# Patient Record
(demographics unavailable — no encounter records)

---

## 2025-05-29 NOTE — DISCUSSION/SUMMARY
[FreeTextEntry1] : 41 yo no significant PMHx presents for evaluation of new onset seizures on 5/24/2025  plan: Switched Keppra to ER 1g/nightly to reduce fatigue MRI brain w/wout contrast 48HR AEEG Counseled on seizure safety precautions and [place] driving regulations F/u in 1 month

## 2025-05-29 NOTE — PHYSICAL EXAM
[General Appearance - Alert] : alert [General Appearance - In No Acute Distress] : in no acute distress [Oriented To Time, Place, And Person] : oriented to person, place, and time [Person] : oriented to person [Place] : oriented to place [Time] : oriented to time [Fluency] : fluency intact [Cranial Nerves Optic (II)] : visual acuity intact bilaterally,  visual fields full to confrontation, pupils equal round and reactive to light [Cranial Nerves Oculomotor (III)] : extraocular motion intact [Cranial Nerves Trigeminal (V)] : facial sensation intact symmetrically [Cranial Nerves Facial (VII)] : face symmetrical [Cranial Nerves Vestibulocochlear (VIII)] : hearing was intact bilaterally [Cranial Nerves Accessory (XI - Cranial And Spinal)] : head turning and shoulder shrug symmetric [Motor Tone] : muscle tone was normal in all four extremities [Motor Strength] : muscle strength was normal in all four extremities [Sensation Tactile Decrease] : light touch was intact [Abnormal Walk] : normal gait [Balance] : balance was intact [Romberg's Sign] : Romberg's sign was negtive [Coordination - Dysmetria Impaired Finger-to-Nose Bilateral] : not present

## 2025-05-29 NOTE — HISTORY OF PRESENT ILLNESS
[FreeTextEntry1] : 43 yo no significant PMHx presents for evaluation of new onset seizures Accompanied by wife  On 5/24/2025 patient was working out in the evening. He felt lightheaded which was followed by loss consciousness. The wife reports she heard grunting and walked in to see him on the floor shaking/stiffening, +tongue bite and then postictal confusion. EMS called, taken to [facility] in [place]. Had another seizure in ER, treated with Ativan 2 mg and Keppra 1000mg. Admitted overnight for observation. Next thing patient remembers is waking up in hospital.  Routine EEG was normal. CTH motion degraded but not acute pathology Discharged on Keppra 500mg BID Patient reports since starting Keppra he has been feeling fatigue.  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

## 2025-06-11 NOTE — HISTORY OF PRESENT ILLNESS
[de-identified] : 41 y/o right- handed male, never smoker, with no significant PMHx who presents for evaluation of trace subacute subdural hemorrhagic collection, referred by neurologist Dr. Bhumika Jalloh.   - On 5/24/2025 patient was working out in the evening (strenuous lifting/squat). He recalls feeling lightheaded and then LOC. His spouse heard a large sound and found him on the floor shaking/stiffening, +tongue bite and then postictal confusion. EMS was called and he was taken to [facility] in [place]. Had another seizure in ER there, treated with Ativan 2 mg and Keppra 1000mg. Routine EEG normal. CTH was motion degraded but without acute pathology. He was discharged on 500 mg keppra BID.   - 5/28/25 pt had evaluation with neurologist Dr. Jalloh who ordered 48 hr EEG and ordered MRI Brain w/wo which he completed on 6/7/25 which reported a Thin crescentic T1 and FLAIR hyperintense signal along the left posterior cerebral convexity, suggestive of a trace subacute subdural hemorrhagic collection. No associated mass effect. No structural epileptogenic lesion is identified.  Due to findings, patient was referred to neurosurgery.   - Presents today for evaluation.  He notes continued intermittent dull headaches to the front and top of his head, left worse than right, worse when bending over.  Continues on Keppra per Dr. Jalloh  Neurology: Bhumika Jalloh

## 2025-06-11 NOTE — DATA REVIEWED
[de-identified] : ACC: [id number]     EXAM:  MR BRAIN SEIZURE EP WAW IC 3D#   ORDERED BY: SCOTT FOWLER  PROCEDURE DATE:  06/07/2025    INTERPRETATION:  PROCEDURE: MRI Brain without and with contrast  INDICATION: New onset seizure  TECHNIQUE: MRI Brain was performed without and with contrast, utilizing a seizure protocol. Sagittal T1, axial T1, T2, FLAIR, diffusion, gradient echo images of the brain were obtained. Coronal FLAIR and T2 images through the temporal lobes were also obtained. 3-D volumetric T1-weighted imaging obtained.  IV Contrast: Gadavist  7ml cc administered .5ml cc discarded  COMPARISON: none available  FINDINGS:  The ventricles and sulci are normal in size and configuration.  There is no mass, midline shift or regional mass effect.  There is no acute parenchymal hemorrhage. There is thin crescentic extra-axial FLAIR hyperintense signal about the left parieto-occipital and temporal convexities, with associated T1 hyperintense signal, suggestive of a thin subacute hemorrhagic subdural collection.  No evidence of recent infarction. No abnormal intracranial enhancement.   Evaluation of the temporal lobes demonstrates the hippocampi to be within normal limits, without definite signal abnormality or atrophy.  No developmental cortical malformation is identified. There is no significant parenchymal abnormal susceptibility.  The dominant arterial skull base flow voids are present.  Partially empty sella noted.  The visualized paranasal sinuses demonstrate trace diffuse mucosal thickening. The mastoid air cells are well-aerated.   IMPRESSION:  No structural epileptogenic lesion is identified.  Thin crescentic T1 and FLAIR hyperintense signal along the left posterior cerebral convexity, suggestive of a trace subacute subdural hemorrhagic collection. No associated mass effect.  Findings were discussed with Dr. Fowler by phone on 6/9/2025 3:03 PM with read back confirmation.  --- End of Report ---

## 2025-06-11 NOTE — ASSESSMENT
[FreeTextEntry1] : This 42-year-old male was referred by Dr. Jalloh for evaluation of a trace subacute subdural hemorrhage.  He had a syncopal episode and seizure while exercising at home approximately 2.5 to 3 weeks ago. He was taken to [facility], where he had another seizure in the ER. The initial head CT was reportedly without acute pathology. He was discharged on Keppra 500 mg. A subsequent 48-hour EEG was negative. Brain MRI showed a crescent-shaped FLAIR hyperintensity in the left posterior region, suggestive of subacute subdural hemorrhage. The plan is to repeat the MRI in two weeks, defer to neurology/epileptology for further workup of seizures, continue Keppra, advise the patient to avoid strenuous activity, and follow up after repeat imaging.   Dr. D'Amico independently reviewed all available images with patient and his spouse.    PLAN: - Alternate advil/ tylenol for headache management - Repeat MRI in 2 weeks with follow- up after for review  - Continue follow- up with neurologist Dr. Jalloh for seizure workup   Patient verbalizes understanding of today's discussion and next steps in treatment plan.   Today, my ACP, Sonia Callahan, was here to observe my evaluation and management services for this patient to be followed going forward.     Patient appreciates and agrees with current plan. This note was generated using medical dictation software. A reasonable effort has been made for proofreading its contents, but typos may still remain. If there are any questions or points of clarification needed, please notify my office.  A total of 45 minutes was spent reviewing the labs, imaging and physical examination of the patient. We discussed the diagnosis, and the plan. The patient's questions were answered. The patient demonstrated an excellent understanding of the plan.

## 2025-06-11 NOTE — HISTORY OF PRESENT ILLNESS
[de-identified] : 43 y/o right- handed male, never smoker, with no significant PMHx who presents for evaluation of trace subacute subdural hemorrhagic collection, referred by neurologist Dr. Bhumika Jalloh.   - On 5/24/2025 patient was working out in the evening (strenuous lifting/squat). He recalls feeling lightheaded and then LOC. His spouse heard a large sound and found him on the floor shaking/stiffening, +tongue bite and then postictal confusion. EMS was called and he was taken to [facility] in [place]. Had another seizure in ER there, treated with Ativan 2 mg and Keppra 1000mg. Routine EEG normal. CTH was motion degraded but without acute pathology. He was discharged on 500 mg keppra BID.   - 5/28/25 pt had evaluation with neurologist Dr. Jalloh who ordered 48 hr EEG and ordered MRI Brain w/wo which he completed on 6/7/25 which reported a Thin crescentic T1 and FLAIR hyperintense signal along the left posterior cerebral convexity, suggestive of a trace subacute subdural hemorrhagic collection. No associated mass effect. No structural epileptogenic lesion is identified.  Due to findings, patient was referred to neurosurgery.   - Presents today for evaluation.  He notes continued intermittent dull headaches to the front and top of his head, left worse than right, worse when bending over.  Continues on Keppra per Dr. Jalloh  Neurology: Bhumika Jalloh

## 2025-06-11 NOTE — ASSESSMENT
[FreeTextEntry1] : This 42-year-old male was referred by Dr. Jalloh for evaluation of a trace subacute subdural hemorrhage.  He had a syncopal episode and seizure while exercising at home approximately 2.5 to 3 weeks ago. He was taken to [facility], where he had another seizure in the ER. The initial head CT was reportedly without acute pathology. He was discharged on Keppra 500 mg. A subsequent 48-hour EEG was negative. Brain MRI showed a crescent-shaped FLAIR hyperintensity in the left posterior region, suggestive of subacute subdural hemorrhage. The plan is to repeat the MRI in two weeks, defer to neurology/epileptology for further workup of seizures, continue Keppra, advise the patient to avoid strenuous activity, and follow up after repeat imaging.   Dr. D'Amico independently reviewed all available images with patient and his spouse.    PLAN: - Alternate advil/ tylenol for headache management - Repeat MRI in 2 weeks with follow- up after for review  - Continue follow- up with neurologist Dr. Jalloh for seizure workup   Patient verbalizes understanding of today's discussion and next steps in treatment plan.   Today, my ACP, Sonia Callahan, was here to observe my evaluation and management services for this patient to be followed going forward.     Patient appreciates and agrees with current plan. This note was generated using medical dictation software. A reasonable effort has been made for proofreading its contents, but typos may still remain. If there are any questions or points of clarification needed, please notify my office.  A total of 45 minutes was spent reviewing the labs, imaging and physical examination of the patient. We discussed the diagnosis, and the plan. The patient's questions were answered. The patient demonstrated an excellent understanding of the plan.

## 2025-06-11 NOTE — DATA REVIEWED
[de-identified] : ACC: [id number]     EXAM:  MR BRAIN SEIZURE EP WAW IC 3D#   ORDERED BY: SCOTT FOWLER  PROCEDURE DATE:  06/07/2025    INTERPRETATION:  PROCEDURE: MRI Brain without and with contrast  INDICATION: New onset seizure  TECHNIQUE: MRI Brain was performed without and with contrast, utilizing a seizure protocol. Sagittal T1, axial T1, T2, FLAIR, diffusion, gradient echo images of the brain were obtained. Coronal FLAIR and T2 images through the temporal lobes were also obtained. 3-D volumetric T1-weighted imaging obtained.  IV Contrast: Gadavist  7ml cc administered .5ml cc discarded  COMPARISON: none available  FINDINGS:  The ventricles and sulci are normal in size and configuration.  There is no mass, midline shift or regional mass effect.  There is no acute parenchymal hemorrhage. There is thin crescentic extra-axial FLAIR hyperintense signal about the left parieto-occipital and temporal convexities, with associated T1 hyperintense signal, suggestive of a thin subacute hemorrhagic subdural collection.  No evidence of recent infarction. No abnormal intracranial enhancement.   Evaluation of the temporal lobes demonstrates the hippocampi to be within normal limits, without definite signal abnormality or atrophy.  No developmental cortical malformation is identified. There is no significant parenchymal abnormal susceptibility.  The dominant arterial skull base flow voids are present.  Partially empty sella noted.  The visualized paranasal sinuses demonstrate trace diffuse mucosal thickening. The mastoid air cells are well-aerated.   IMPRESSION:  No structural epileptogenic lesion is identified.  Thin crescentic T1 and FLAIR hyperintense signal along the left posterior cerebral convexity, suggestive of a trace subacute subdural hemorrhagic collection. No associated mass effect.  Findings were discussed with Dr. Fowler by phone on 6/9/2025 3:03 PM with read back confirmation.  --- End of Report ---

## 2025-06-11 NOTE — REVIEW OF SYSTEMS
[As Noted in HPI] : as noted in HPI [Seizures] : convulsions [Fever] : no fever [Chills] : no chills [Confused or Disoriented] : no confusion [Memory Lapses or Loss] : no memory loss [Facial Weakness] : no facial weakness [Arm Weakness] : no arm weakness [Hand Weakness] : no hand weakness [Leg Weakness] : no leg weakness [Chest Pain] : no chest pain [Palpitations] : no palpitations [Shortness Of Breath] : no shortness of breath

## 2025-06-11 NOTE — PHYSICAL EXAM
[Oriented To Time, Place, And Person] : oriented to person, place, and time [Impaired Insight] : insight and judgment were intact [Memory Recent] : recent memory was not impaired [Sclera] : the sclera and conjunctiva were normal [Neck Appearance] : the appearance of the neck was normal [] : no respiratory distress [Respiration, Rhythm And Depth] : normal respiratory rhythm and effort [Abnormal Walk] : normal gait [Skin Color & Pigmentation] : normal skin color and pigmentation

## 2025-07-03 NOTE — REASON FOR VISIT
[Follow-Up: _____] : a [unfilled] follow-up visit [Home] : at home, [unfilled] , at the time of the visit. [Medical Office: ([facility])___] : at the medical office located in  [Telehealth (audio & video)] : This visit was provided via telehealth using real-time 2-way audio visual technology. [Verbal consent obtained from patient] : the patient, [unfilled] [FreeTextEntry1] : hx of trace subacute SDH; follow up and MRI review

## 2025-07-03 NOTE — ASSESSMENT
[FreeTextEntry1] : This 42-year-old male presented for follow-up of a subacute subdural hematoma, possibly related to weightlifting or a fall.  It is unclear whether a fall led to a seizure or vice versa.  He is following up with neurology (Dr. Jalloh).  A repeat MRI was ordered approximately three weeks after the initial incident.  A 3-week follow-up MRI showed resolution of the subdural hematoma.  He reported significant improvement in his symptoms, including decreased headache frequency and severity, and improved cognitive function. The plan is to review the repeat MRI results, assess his symptoms and recovery, discuss the findings, consider clearance for normal activity, coordinate care with neurology, and schedule follow-up as needed. He was cleared to fly and resume contact sports.  Dr. D'Amico independently reviewed all available images with patient.   PLAN: - Continue f/u with neurologist Dr. Jalloh, AED and weaning per her - Cleared to resume normal activities slowly and progress as tolerated  - Cleared to fly - RTC prn   Patient verbalizes understanding of today's discussion and next steps in treatment plan.   Today, my ACP, Sonia Callahan, was here to observe my evaluation and management services for this new problem/exacerbated condition to be followed going forward.          Patient appreciates and agrees with current plan. This note was generated using medical dictation software. A reasonable effort has been made for proofreading its contents, but typos may still remain. If there are any questions or points of clarification needed, please notify my office.   A total of 25 minutes was spent reviewing the labs, imaging and physical examination of the patient. We discussed the diagnosis, and the plan. The patient's questions were answered. The patient demonstrated an excellent understanding of the plan.

## 2025-07-03 NOTE — REASON FOR VISIT
[Follow-Up: _____] : a [unfilled] follow-up visit [Home] : at home, [unfilled] , at the time of the visit. [Medical Office: ([facility])___] : at the medical office located in  [Telehealth (audio & video)] : This visit was provided via telehealth using real-time 2-way audio visual technology. [Verbal consent obtained from patient] : the patient, [unfilled] [FreeTextEntry1] : hx of trace subacute SDH; follow up and MRI review

## 2025-07-03 NOTE — HISTORY OF PRESENT ILLNESS
[FreeTextEntry1] : 43 y/o right- handed male, never smoker, with no significant PMHx who presented for evaluation of trace subacute subdural hemorrhagic collection, referred by neurologist Dr. Bhumika Jalloh.  - On 5/24/2025 patient was working out in the evening (strenuous lifting/squat). He recalls feeling lightheaded and then LOC. His spouse heard a large sound and found him on the floor shaking/stiffening, +tongue bite and then postictal confusion. EMS was called and he was taken to [facility] in [place]. Had another seizure in ER there, treated with Ativan 2 mg and Keppra 1000mg. Routine EEG normal. CTH was motion degraded but without acute pathology. He was discharged on 500 mg keppra BID.  - 5/28/25 pt had evaluation with neurologist Dr. Jalloh who ordered 48 hr EEG and ordered MRI Brain w/wo which he completed on 6/7/25 which reported a Thin crescentic T1 and FLAIR hyperintense signal along the left posterior cerebral convexity, suggestive of a trace subacute subdural hemorrhagic collection. No associated mass effect. No structural epileptogenic lesion is identified. Due to findings, patient was referred to neurosurgery.  - 6/11/25 pt presented for initial evaluation. Noted continued intermittent dull headaches to the front and top of his head, left worse than right, worse when bending over. Continues on Keppra per Dr. Jalloh.  Recommended to repeat imaging with MRI in 2 weeks; continued neurology/epileptology workup.   Returns TODAY for follow- up and MRI review.  6/28/25 MRI- Previous SDH no longer seen He endorses overall with improvement over the past week; headache improved and no longer constant, still has residual 10 second bouts of headaches intermittently.   Neurology: Bhumika Jalloh

## 2025-07-03 NOTE — REASON FOR VISIT
[Follow-Up: _____] : a [unfilled] follow-up visit [Home] : at home, [unfilled] , at the time of the visit. [Medical Office: ([facility])___] : at the medical office located in  [Telehealth (audio & video)] : This visit was provided via telehealth using real-time 2-way audio visual technology. [Verbal consent obtained from patient] : the patient, [unfilled] [FreeTextEntry1] : hx of trace subacute SDH; follow up and MRI review

## 2025-07-03 NOTE — HISTORY OF PRESENT ILLNESS
[FreeTextEntry1] : 41 y/o right- handed male, never smoker, with no significant PMHx who presented for evaluation of trace subacute subdural hemorrhagic collection, referred by neurologist Dr. Bhumika Jalloh.  - On 5/24/2025 patient was working out in the evening (strenuous lifting/squat). He recalls feeling lightheaded and then LOC. His spouse heard a large sound and found him on the floor shaking/stiffening, +tongue bite and then postictal confusion. EMS was called and he was taken to [facility] in [place]. Had another seizure in ER there, treated with Ativan 2 mg and Keppra 1000mg. Routine EEG normal. CTH was motion degraded but without acute pathology. He was discharged on 500 mg keppra BID.  - 5/28/25 pt had evaluation with neurologist Dr. Jalloh who ordered 48 hr EEG and ordered MRI Brain w/wo which he completed on 6/7/25 which reported a Thin crescentic T1 and FLAIR hyperintense signal along the left posterior cerebral convexity, suggestive of a trace subacute subdural hemorrhagic collection. No associated mass effect. No structural epileptogenic lesion is identified. Due to findings, patient was referred to neurosurgery.  - 6/11/25 pt presented for initial evaluation. Noted continued intermittent dull headaches to the front and top of his head, left worse than right, worse when bending over. Continues on Keppra per Dr. Jalloh.  Recommended to repeat imaging with MRI in 2 weeks; continued neurology/epileptology workup.   Returns TODAY for follow- up and MRI review.  6/28/25 MRI- Previous SDH no longer seen He endorses overall with improvement over the past week; headache improved and no longer constant, still has residual 10 second bouts of headaches intermittently.   Neurology: Bhumika Jalloh

## 2025-07-03 NOTE — DATA REVIEWED
[de-identified] : [facility]                                          [address] [place] [address]  [id number]                                        Department of Radiology                                             [phone]   Patient Name:      HUGO ZAPATA                Location:       [facility] Rec #:        [id number]                    Account #:      [id number] YOB: 1983                    Ordering:       RIA MARIN Age: 42               Sex:    M                 Attending:      RIA MARIN PCP:        NO PRIMARY CARE PHYSICIAN ______________________________________________________________________________________  Exam Date:      06/28/25 Exam:         MRI BRAIN Order#:       MRI [id number]    Clinical indication: Seizure. Subdural hematoma. Follow-up MRI.   MRI of brain was from sagittal T1 axial T1 and T2 T2 FLAIR diffusion and susceptibility sequence. Coronal T2 and T2 FLAIR sequence was performed as well.   The lateral ventricles have a normal configuration   There is no acute hemorrhage mass mass effect seen.   Previously noted small subdural hematoma involving the left temporal frontal parietal region is no longer appreciated.   Evaluation of the diffusion weighted sequence demonstrates no abnormal areas of restricted diffusion to suggest acute infarct.   The large vessels demonstrate normal flow voids   The visualized paranasal sinuses mastoid and middle ear regions appear clear.   Both hippocampi appear normal symmetric.   IMPRESSION: No evidence of acute hemorrhage, mass or mass effect is seen.   --- End of Report ---    ***Electronically Signed *** ----------------------------------------------- Sarath Cotto MD              07/01/25 1723  Dictated on 07/01/25   Report cc:  RIA MARIN;

## 2025-07-03 NOTE — DATA REVIEWED
[de-identified] : [facility]                                          [address] [place] [address]  [id number]                                        Department of Radiology                                             [phone]   Patient Name:      HUGO ZAPATA                Location:       [facility] Rec #:        [id number]                    Account #:      [id number] YOB: 1983                    Ordering:       RIA MARIN Age: 42               Sex:    M                 Attending:      RIA MARIN PCP:        NO PRIMARY CARE PHYSICIAN ______________________________________________________________________________________  Exam Date:      06/28/25 Exam:         MRI BRAIN Order#:       MRI [id number]    Clinical indication: Seizure. Subdural hematoma. Follow-up MRI.   MRI of brain was from sagittal T1 axial T1 and T2 T2 FLAIR diffusion and susceptibility sequence. Coronal T2 and T2 FLAIR sequence was performed as well.   The lateral ventricles have a normal configuration   There is no acute hemorrhage mass mass effect seen.   Previously noted small subdural hematoma involving the left temporal frontal parietal region is no longer appreciated.   Evaluation of the diffusion weighted sequence demonstrates no abnormal areas of restricted diffusion to suggest acute infarct.   The large vessels demonstrate normal flow voids   The visualized paranasal sinuses mastoid and middle ear regions appear clear.   Both hippocampi appear normal symmetric.   IMPRESSION: No evidence of acute hemorrhage, mass or mass effect is seen.   --- End of Report ---    ***Electronically Signed *** ----------------------------------------------- Sarath Cotto MD              07/01/25 1723  Dictated on 07/01/25   Report cc:  RIA MARIN;

## 2025-07-03 NOTE — DATA REVIEWED
Post-Op Assessment Note    CV Status:  Stable  Pain Score: 0    Pain management: adequate     Mental Status:  Alert and awake   Hydration Status:  Euvolemic   PONV Controlled:  Controlled   Airway Patency:  Patent and adequate      Post Op Vitals Reviewed: Yes      Staff: CRNA         No notable events documented.     BP   111/74   Temp 97.9   Pulse 81 (10/18/23 1341)   Resp 20   SpO2 100 [de-identified] : [facility]                                          [address] [place] [address]  [id number]                                        Department of Radiology                                             [phone]   Patient Name:      HUGO ZAPATA                Location:       [facility] Rec #:        [id number]                    Account #:      [id number] YOB: 1983                    Ordering:       RIA MARIN Age: 42               Sex:    M                 Attending:      RIA MARIN PCP:        NO PRIMARY CARE PHYSICIAN ______________________________________________________________________________________  Exam Date:      06/28/25 Exam:         MRI BRAIN Order#:       MRI [id number]    Clinical indication: Seizure. Subdural hematoma. Follow-up MRI.   MRI of brain was from sagittal T1 axial T1 and T2 T2 FLAIR diffusion and susceptibility sequence. Coronal T2 and T2 FLAIR sequence was performed as well.   The lateral ventricles have a normal configuration   There is no acute hemorrhage mass mass effect seen.   Previously noted small subdural hematoma involving the left temporal frontal parietal region is no longer appreciated.   Evaluation of the diffusion weighted sequence demonstrates no abnormal areas of restricted diffusion to suggest acute infarct.   The large vessels demonstrate normal flow voids   The visualized paranasal sinuses mastoid and middle ear regions appear clear.   Both hippocampi appear normal symmetric.   IMPRESSION: No evidence of acute hemorrhage, mass or mass effect is seen.   --- End of Report ---    ***Electronically Signed *** ----------------------------------------------- Sarath Cotto MD              07/01/25 1723  Dictated on 07/01/25   Report cc:  RIA MARIN;

## 2025-07-03 NOTE — HISTORY OF PRESENT ILLNESS
[FreeTextEntry1] : 41 y/o right- handed male, never smoker, with no significant PMHx who presented for evaluation of trace subacute subdural hemorrhagic collection, referred by neurologist Dr. Bhumika Jalloh.  - On 5/24/2025 patient was working out in the evening (strenuous lifting/squat). He recalls feeling lightheaded and then LOC. His spouse heard a large sound and found him on the floor shaking/stiffening, +tongue bite and then postictal confusion. EMS was called and he was taken to [facility] in [place]. Had another seizure in ER there, treated with Ativan 2 mg and Keppra 1000mg. Routine EEG normal. CTH was motion degraded but without acute pathology. He was discharged on 500 mg keppra BID.  - 5/28/25 pt had evaluation with neurologist Dr. Jalloh who ordered 48 hr EEG and ordered MRI Brain w/wo which he completed on 6/7/25 which reported a Thin crescentic T1 and FLAIR hyperintense signal along the left posterior cerebral convexity, suggestive of a trace subacute subdural hemorrhagic collection. No associated mass effect. No structural epileptogenic lesion is identified. Due to findings, patient was referred to neurosurgery.  - 6/11/25 pt presented for initial evaluation. Noted continued intermittent dull headaches to the front and top of his head, left worse than right, worse when bending over. Continues on Keppra per Dr. Jalloh.  Recommended to repeat imaging with MRI in 2 weeks; continued neurology/epileptology workup.   Returns TODAY for follow- up and MRI review.  6/28/25 MRI- Previous SDH no longer seen He endorses overall with improvement over the past week; headache improved and no longer constant, still has residual 10 second bouts of headaches intermittently.   Neurology: Bhumika Jalloh

## 2025-07-28 NOTE — REASON FOR VISIT
[Home] : at home, [unfilled] , at the time of the visit. [Medical Office: ([facility])___] : at the medical office located in  [Telehealth (audio & video)] : This visit was provided via telehealth using real-time 2-way audio visual technology. [Verbal consent obtained from patient] : the patient, [unfilled] [Follow-Up: _____] : a [unfilled] follow-up visit

## 2025-07-28 NOTE — REASON FOR VISIT
[Home] : at home, [unfilled] , at the time of the visit. [Medical Office: ([facility])___] : at the medical office located in  [Telehealth (audio & video)] : This visit was provided via telehealth using real-time 2-way audio visual technology. [Verbal consent obtained from patient] : the patient, [unfilled] [Follow-Up: _____] : a [unfilled] follow-up visit

## 2025-07-28 NOTE — PHYSICAL EXAM
[General Appearance - Alert] : alert [General Appearance - In No Acute Distress] : in no acute distress [Oriented To Time, Place, And Person] : oriented to person, place, and time [Person] : oriented to person [Place] : oriented to place [Time] : oriented to time [Fluency] : fluency intact [Cranial Nerves Oculomotor (III)] : extraocular motion intact [Cranial Nerves Vestibulocochlear (VIII)] : hearing was intact bilaterally

## 2025-07-28 NOTE — HISTORY OF PRESENT ILLNESS
[FreeTextEntry1] : Interim Hx: 7/28/2025  AEEG5/29-6/1/2025 unremarkable   6/7/2025 MRI brain-trace subacute subdural hemorrhage 6/11/2025 Seen by Nsx for eval. 6/28/2025 Repeat MRI brain- Previous SDH no longer seen  7/3/2025 Followed up with Nsx- Cleared to resume normal activities slowly and cleared to fly. Also as per patient discussed that the head trauma may have caused seizure.  No additional seizures reported. Lowered Keppra to XR 500mg/daily around 7/4 b/c was running low.  Blood work next month- CBC, BMP, lipid panel tsh ______________________ Initial Hx: 5/28/2025 41 yo no significant PMHx presents for evaluation of new onset seizures Accompanied by wife  On 5/24/2025 patient was working out in the evening. He felt lightheaded which was followed by loss consciousness. The wife reports she heard grunting and walked in to see him on the floor shaking/stiffening, +tongue bite and then postictal confusion. EMS called, taken to [facility] in [place]. Had another seizure in ER, treated with Ativan 2 mg and Keppra 1000mg. Admitted overnight for observation. Next thing patient remembers is waking up in hospital.  Routine EEG was normal. CTH motion degraded but not acute pathology Discharged on Keppra 500mg BID Patient reports since starting Keppra he has been feeling fatigue.  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

## 2025-07-28 NOTE — DISCUSSION/SUMMARY
[FreeTextEntry1] : 43 yo no significant PMHx presents for evaluation of new onset seizure (x2) on 5/24/2025 AEEG 5/29-6/1/2025 unremarkable  6/7/2025 MRI brain-trace subacute subdural hemorrhage 6/28/2025 Repeat MRI brain- Previous SDH no longer seen   plan: Given first time seizure with no clear ongoing risk factors we agreed to stop Keppra (also patient's preference) Also discussed that it is unclear if patient had a syncopal episode which caused him to hit his head then seize or if seizure itself caused the head trauma Counseled on seizure safety precautions and [facility] driving regulations

## 2025-07-28 NOTE — DISCUSSION/SUMMARY
[FreeTextEntry1] : 43 yo no significant PMHx presents for evaluation of new onset seizure (x2) on 5/24/2025 AEEG 5/29-6/1/2025 unremarkable  6/7/2025 MRI brain-trace subacute subdural hemorrhage 6/28/2025 Repeat MRI brain- Previous SDH no longer seen   plan: Given first time seizure with no clear ongoing risk factors we agreed to stop Keppra (also patient's preference) Also discussed that it is unclear if patient had a syncopal episode which caused him to hit his head then seize or if seizure itself caused the head trauma Counseled on seizure safety precautions and [facility] driving regulations

## 2025-07-28 NOTE — HISTORY OF PRESENT ILLNESS
[FreeTextEntry1] : Interim Hx: 7/28/2025  AEEG5/29-6/1/2025 unremarkable   6/7/2025 MRI brain-trace subacute subdural hemorrhage 6/11/2025 Seen by Nsx for eval. 6/28/2025 Repeat MRI brain- Previous SDH no longer seen  7/3/2025 Followed up with Nsx- Cleared to resume normal activities slowly and cleared to fly. Also as per patient discussed that the head trauma may have caused seizure.  No additional seizures reported. Lowered Keppra to XR 500mg/daily around 7/4 b/c was running low.  Blood work next month- CBC, BMP, lipid panel tsh ______________________ Initial Hx: 5/28/2025 41 yo no significant PMHx presents for evaluation of new onset seizures Accompanied by wife  On 5/24/2025 patient was working out in the evening. He felt lightheaded which was followed by loss consciousness. The wife reports she heard grunting and walked in to see him on the floor shaking/stiffening, +tongue bite and then postictal confusion. EMS called, taken to [facility] in [place]. Had another seizure in ER, treated with Ativan 2 mg and Keppra 1000mg. Admitted overnight for observation. Next thing patient remembers is waking up in hospital.  Routine EEG was normal. CTH motion degraded but not acute pathology Discharged on Keppra 500mg BID Patient reports since starting Keppra he has been feeling fatigue.  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